# Patient Record
Sex: MALE | Race: WHITE | NOT HISPANIC OR LATINO | URBAN - METROPOLITAN AREA
[De-identification: names, ages, dates, MRNs, and addresses within clinical notes are randomized per-mention and may not be internally consistent; named-entity substitution may affect disease eponyms.]

---

## 2021-05-29 ENCOUNTER — RECORDS - HEALTHEAST (OUTPATIENT)
Dept: ADMINISTRATIVE | Facility: CLINIC | Age: 51
End: 2021-05-29

## 2021-05-30 ENCOUNTER — RECORDS - HEALTHEAST (OUTPATIENT)
Dept: ADMINISTRATIVE | Facility: CLINIC | Age: 51
End: 2021-05-30

## 2021-06-02 ENCOUNTER — RECORDS - HEALTHEAST (OUTPATIENT)
Dept: ADMINISTRATIVE | Facility: CLINIC | Age: 51
End: 2021-06-02

## 2023-08-03 ENCOUNTER — MEDICAL CORRESPONDENCE (OUTPATIENT)
Dept: HEALTH INFORMATION MANAGEMENT | Facility: CLINIC | Age: 53
End: 2023-08-03
Payer: COMMERCIAL

## 2023-08-03 ENCOUNTER — TRANSFERRED RECORDS (OUTPATIENT)
Dept: HEALTH INFORMATION MANAGEMENT | Facility: CLINIC | Age: 53
End: 2023-08-03
Payer: COMMERCIAL

## 2023-08-04 ENCOUNTER — TRANSCRIBE ORDERS (OUTPATIENT)
Dept: OTHER | Age: 53
End: 2023-08-04

## 2023-08-04 DIAGNOSIS — H53.9 VISUAL DISTURBANCE: Primary | ICD-10-CM

## 2023-08-10 ENCOUNTER — TELEPHONE (OUTPATIENT)
Dept: OPHTHALMOLOGY | Facility: CLINIC | Age: 53
End: 2023-08-10
Payer: COMMERCIAL

## 2023-08-10 NOTE — TELEPHONE ENCOUNTER
Amaris and CRISTEL Frederick can make an appointment with Dr. Arnold for a  New adult     Shameka Mosqueda Communication Facilitator on 8/10/2023 at 3:37 PM

## 2024-02-09 ENCOUNTER — TELEPHONE (OUTPATIENT)
Dept: OPHTHALMOLOGY | Facility: CLINIC | Age: 54
End: 2024-02-09
Payer: COMMERCIAL

## 2024-02-09 NOTE — TELEPHONE ENCOUNTER
Made an appointment for 3/19 @ 815 am with Clayton Del Toro for a Vitreolys  is consult     Otoniel called and spoke to the call center to schedule     Shameka Mosqueda Communication Facilitator on 2/9/2024 at 3:46 PM

## 2024-03-03 ENCOUNTER — HEALTH MAINTENANCE LETTER (OUTPATIENT)
Age: 54
End: 2024-03-03

## 2024-03-19 ENCOUNTER — OFFICE VISIT (OUTPATIENT)
Dept: OPHTHALMOLOGY | Facility: CLINIC | Age: 54
End: 2024-03-19
Attending: OPHTHALMOLOGY
Payer: COMMERCIAL

## 2024-03-19 DIAGNOSIS — H43.813 POSTERIOR VITREOUS DETACHMENT OF BOTH EYES: Primary | ICD-10-CM

## 2024-03-19 DIAGNOSIS — H43.313 VITREOUS STRAND, BILATERAL: ICD-10-CM

## 2024-03-19 DIAGNOSIS — H52.13 MYOPIA OF BOTH EYES: ICD-10-CM

## 2024-03-19 PROCEDURE — 99211 OFF/OP EST MAY X REQ PHY/QHP: CPT | Performed by: OPHTHALMOLOGY

## 2024-03-19 PROCEDURE — 92004 COMPRE OPH EXAM NEW PT 1/>: CPT | Performed by: OPHTHALMOLOGY

## 2024-03-19 RX ORDER — SODIUM PHOSPHATE,MONO-DIBASIC 19G-7G/118
1 ENEMA (ML) RECTAL DAILY
COMMUNITY

## 2024-03-19 RX ORDER — OMEGA-3/DHA/EPA/FISH OIL 300-1000MG
1 CAPSULE ORAL DAILY
COMMUNITY

## 2024-03-19 RX ORDER — TADALAFIL 20 MG/1
20 TABLET ORAL DAILY
COMMUNITY
Start: 2023-09-06

## 2024-03-19 RX ORDER — CLOBETASOL PROPIONATE 0.5 MG/G
OINTMENT TOPICAL PRN
COMMUNITY

## 2024-03-19 ASSESSMENT — VISUAL ACUITY
OS_PH_CC: 20/20-
CORRECTION_TYPE: CONTACTS
OS_CC: 20/40
METHOD: SNELLEN - LINEAR
OD_CC: 20/20-

## 2024-03-19 ASSESSMENT — CONF VISUAL FIELD
OS_INFERIOR_TEMPORAL_RESTRICTION: 0
OS_SUPERIOR_TEMPORAL_RESTRICTION: 0
OS_INFERIOR_NASAL_RESTRICTION: 0
OD_INFERIOR_TEMPORAL_RESTRICTION: 0
OS_NORMAL: 1
OD_SUPERIOR_NASAL_RESTRICTION: 0
METHOD: COUNTING FINGERS
OD_SUPERIOR_TEMPORAL_RESTRICTION: 0
OD_NORMAL: 1
OS_SUPERIOR_NASAL_RESTRICTION: 0
OD_INFERIOR_NASAL_RESTRICTION: 0

## 2024-03-19 ASSESSMENT — EXTERNAL EXAM - RIGHT EYE: OD_EXAM: NORMAL

## 2024-03-19 ASSESSMENT — CUP TO DISC RATIO
OS_RATIO: 0.3
OD_RATIO: 0.3

## 2024-03-19 ASSESSMENT — TONOMETRY
OS_IOP_MMHG: 17
OD_IOP_MMHG: 14
IOP_METHOD: TONOPEN

## 2024-03-19 ASSESSMENT — SLIT LAMP EXAM - LIDS
COMMENTS: NORMAL
COMMENTS: NORMAL

## 2024-03-19 ASSESSMENT — EXTERNAL EXAM - LEFT EYE: OS_EXAM: NORMAL

## 2024-03-19 NOTE — LETTER
3/19/2024       RE: Otoniel Adame  [address] [place] [address] [address] [place] [id number]     Dear Dr. Riggs,    Thank you for referring your patient, Otoniel Adame, to the [facility] [place] at [facility]. Please see a copy of my visit note below.    Chief Complaint(s) and History of Present Illness(es)     Floaters Both Eyes            Laterality: both eyes    Duration: years    Course: gradually worsening    Associated symptoms: Negative for flashes    Pain scale: 0/10          Comments    Pt here for vitreolysis consult  He states he has been bothered by floaters in both eyes his whole life,   but it is getting worse.  The floaters often block his vision and he has   to wait for them to pass to see clearly.  He notes eye exams to figure out   his prescriptions have been hard because the floaters are so intrusive.   He denies flashing lights    +multifocal contact lens wearer, denies previous ocular sx     Kenisha Stratton, COT 8:14 AM 03/19/2024        Review of systems for the eyes was negative other than the pertinent positives/negatives listed in the HPI.      Assessment & Plan      Otoniel Adame is a 54 year old male with the following diagnoses:   1. Posterior vitreous detachment of both eyes    2. Vitreous strand, bilateral    3. Myopia of both eyes       New patient here for evaluation of bilateral floaters.  Large diffuse mass of floaters, often obstructing central vision.  Wears daily mf - contact lenses.      Poor target for YAG given diffuse nature of central floaters  Discussed association of multifocal optics and floater symptoms.  Consider trial of monofocal lenses   Discussed option of dilute atropine.  He will monitor symptoms after today's dilation and call if interested in prescription   Offered referral to retina surgeon for vitrectomy consult.  He will consider    Discussed symptoms of retinal tear/detachment and the need to be seen urgently should they occur         Patient  disposition:   Return if symptoms worsen or fail to improve.           Attending Physician Attestation:  Complete documentation of historical and exam elements from today's encounter can be found in the full encounter summary report (not reduplicated in this progress note).  I personally obtained the chief complaint(s) and history of present illness.  I confirmed and edited as necessary the review of systems, past medical/surgical history, family history, social history, and examination findings as documented by others; and I examined the patient myself.  I personally reviewed the relevant tests, images, and reports as documented above.  I formulated and edited as necessary the assessment and plan and discussed the findings and management plan with the patient and family. . - Morteza Arnold MD        Main Ophthalmology Exam       External Exam         Right Left    External Normal Normal              Slit Lamp Exam         Right Left    Lids/Lashes Normal Normal    Conjunctiva/Sclera White and quiet White and quiet    Cornea Clear Clear    Anterior Chamber Deep and quiet Deep and quiet    Iris Dilated Dilated    Lens Dilated Dilated    Vitreous PVD, moderate diffuse posterior pole/mid vitreous syneresis, thin Omalley PVD, moderate diffuse posterior pole/mid vitreous syneresis, thin Omalley              Fundus Exam         Right Left    Disc Normal Normal    C/D Ratio 0.3 0.3    Macula Normal Normal    Vessels Normal Normal    Periphery Normal Normal                  Base Eye Exam       Visual Acuity (Snellen - Linear)         Right Left    Dist cc 20/20- 20/40    Dist ph cc  20/20-      Correction: Contacts              Tonometry (Tonopen, 8:23 AM)         Right Left    Pressure 14 17              Pupils         Pupils APD    Right PERRL None    Left PERRL None              Visual Fields (Counting fingers)         Left Right     Full Full              Extraocular Movement         Right Left     Ortho Ortho     -- -- --    --  --   -- -- --    -- -- --   --  --   -- -- --                 Neuro/Psych       Oriented x3: Yes    Mood/Affect: Normal              Dilation       Both eyes: 2.5% Judah Synephrine, 1.0% Mydriacyl @ 8:23 AM                  Slit Lamp and Fundus Exam       External Exam         Right Left    External Normal Normal              Slit Lamp Exam         Right Left    Lids/Lashes Normal Normal    Conjunctiva/Sclera White and quiet White and quiet    Cornea Clear Clear    Anterior Chamber Deep and quiet Deep and quiet    Iris Dilated Dilated    Lens Dilated Dilated    Vitreous PVD, moderate diffuse posterior pole/mid vitreous syneresis, thin Omalley PVD, moderate diffuse posterior pole/mid vitreous syneresis, thin Omalley              Fundus Exam         Right Left    Disc Normal Normal    C/D Ratio 0.3 0.3    Macula Normal Normal    Vessels Normal Normal    Periphery Normal Normal                    Again, thank you for allowing me to participate in the care of your patient.      Sincerely,    Morteza Arnold MD

## 2024-03-19 NOTE — PROGRESS NOTES
Chief Complaint(s) and History of Present Illness(es)     Floaters Both Eyes            Laterality: both eyes    Duration: years    Course: gradually worsening    Associated symptoms: Negative for flashes    Pain scale: 0/10          Comments    Pt here for vitreolysis consult  He states he has been bothered by floaters in both eyes his whole life,   but it is getting worse.  The floaters often block his vision and he has   to wait for them to pass to see clearly.  He notes eye exams to figure out   his prescriptions have been hard because the floaters are so intrusive.   He denies flashing lights    +multifocal contact lens wearer, denies previous ocular sx     Kenisha Stratton, ROMELIA 8:14 AM 03/19/2024        Review of systems for the eyes was negative other than the pertinent positives/negatives listed in the HPI.      Assessment & Plan      Otoniel Adame is a 54 year old male with the following diagnoses:   1. Posterior vitreous detachment of both eyes    2. Vitreous strand, bilateral    3. Myopia of both eyes       New patient here for evaluation of bilateral floaters.  Large diffuse mass of floaters, often obstructing central vision.  Wears daily mf - contact lenses.      Poor target for YAG given diffuse nature of central floaters  Discussed association of multifocal optics and floater symptoms.  Consider trial of monofocal lenses   Discussed option of dilute atropine.  He will monitor symptoms after today's dilation and call if interested in prescription   Offered referral to retina surgeon for vitrectomy consult.  He will consider    Discussed symptoms of retinal tear/detachment and the need to be seen urgently should they occur         Patient disposition:   Return if symptoms worsen or fail to improve.           Attending Physician Attestation:  Complete documentation of historical and exam elements from today's encounter can be found in the full encounter summary report (not reduplicated in this  progress note).  I personally obtained the chief complaint(s) and history of present illness.  I confirmed and edited as necessary the review of systems, past medical/surgical history, family history, social history, and examination findings as documented by others; and I examined the patient myself.  I personally reviewed the relevant tests, images, and reports as documented above.  I formulated and edited as necessary the assessment and plan and discussed the findings and management plan with the patient and family. . - Morteza Arnold MD

## 2025-03-15 ENCOUNTER — HEALTH MAINTENANCE LETTER (OUTPATIENT)
Age: 55
End: 2025-03-15